# Patient Record
Sex: MALE | Race: ASIAN | Employment: FULL TIME | URBAN - METROPOLITAN AREA
[De-identification: names, ages, dates, MRNs, and addresses within clinical notes are randomized per-mention and may not be internally consistent; named-entity substitution may affect disease eponyms.]

---

## 2021-06-02 ENCOUNTER — HOSPITAL ENCOUNTER (EMERGENCY)
Facility: CLINIC | Age: 21
Discharge: HOME | End: 2021-06-03
Attending: EMERGENCY MEDICINE | Admitting: EMERGENCY MEDICINE
Payer: OTHER MISCELLANEOUS

## 2021-06-02 ENCOUNTER — APPOINTMENT (OUTPATIENT)
Dept: GENERAL RADIOLOGY | Facility: CLINIC | Age: 21
End: 2021-06-02
Attending: INTERNAL MEDICINE
Payer: OTHER MISCELLANEOUS

## 2021-06-02 VITALS
OXYGEN SATURATION: 100 % | DIASTOLIC BLOOD PRESSURE: 78 MMHG | RESPIRATION RATE: 16 BRPM | HEART RATE: 82 BPM | SYSTOLIC BLOOD PRESSURE: 145 MMHG | TEMPERATURE: 98.8 F

## 2021-06-02 DIAGNOSIS — S61.001A AVULSION OF SKIN OF RIGHT THUMB, INITIAL ENCOUNTER: ICD-10-CM

## 2021-06-02 DIAGNOSIS — S60.10XA SUBUNGUAL HEMATOMA OF DIGIT OF HAND, INITIAL ENCOUNTER: ICD-10-CM

## 2021-06-02 PROCEDURE — 99283 EMERGENCY DEPT VISIT LOW MDM: CPT | Mod: 25

## 2021-06-02 PROCEDURE — 73140 X-RAY EXAM OF FINGER(S): CPT | Mod: RT

## 2021-06-02 ASSESSMENT — ENCOUNTER SYMPTOMS: WOUND: 1

## 2021-06-02 NOTE — LETTER
Rand 3, 2021      To Whom It May Concern:      Qasim Perez was seen in our Emergency Department today, 06/03/21.  I expect his condition to improve over the next 7 days.  Weight limit of 10 pounds to right hand. Expect follow up with ortho hand specialist    Sincerely,        Lorena Alicea DO

## 2021-06-03 PROCEDURE — 90714 TD VACC NO PRESV 7 YRS+ IM: CPT | Performed by: EMERGENCY MEDICINE

## 2021-06-03 PROCEDURE — 250N000011 HC RX IP 250 OP 636: Performed by: EMERGENCY MEDICINE

## 2021-06-03 PROCEDURE — 90471 IMMUNIZATION ADMIN: CPT | Performed by: EMERGENCY MEDICINE

## 2021-06-03 RX ORDER — OXYCODONE HYDROCHLORIDE 5 MG/1
5 TABLET ORAL EVERY 6 HOURS PRN
Qty: 5 TABLET | Refills: 0 | Status: SHIPPED | OUTPATIENT
Start: 2021-06-03

## 2021-06-03 RX ORDER — IBUPROFEN 600 MG/1
600 TABLET, FILM COATED ORAL EVERY 6 HOURS PRN
Qty: 20 TABLET | Refills: 0 | Status: SHIPPED | OUTPATIENT
Start: 2021-06-03

## 2021-06-03 RX ADMIN — CLOSTRIDIUM TETANI TOXOID ANTIGEN (FORMALDEHYDE INACTIVATED) AND CORYNEBACTERIUM DIPHTHERIAE TOXOID ANTIGEN (FORMALDEHYDE INACTIVATED) 0.5 ML: 5; 2 INJECTION, SUSPENSION INTRAMUSCULAR at 00:01

## 2021-06-03 ASSESSMENT — ENCOUNTER SYMPTOMS
NUMBNESS: 0
WEAKNESS: 0

## 2021-06-03 NOTE — ED NOTES
Patient's right thumb evaluated for bleeding after digital block and local anesthesia. No visible oozing or blood seen by writer even with pressure applied to wound.  Patient's thumb placed in alumafoam splint after above irrigation and dressing.

## 2021-06-03 NOTE — ED PROVIDER NOTES
History   Chief Complaint:  Hand Injury     HPI   Qasim Perez is a 21 year old male who presents with hand injury. He states he sustained an injury to his right nail while at work today at 1930. He describes smashing his right thumb between a pipe and a tool. He was able to control the bleeding prior to arrival. He denies numbness/weakness to his right thumb. He is right handed. His last tetanus was in 2011 per [facility].     Review of Systems   Skin: Positive for wound.   Neurological: Negative for weakness and numbness.       Allergies:  No known drug allergies    Medications:  The patient is not currently taking any prescribed medications.    Past Medical History:    The patient denies any significant past medical history.    Social History:  Arrives unaccompanied.     Physical Exam     Patient Vitals for the past 24 hrs:   BP Temp Temp src Pulse Resp SpO2   06/02/21 2102 (!) 145/78 98.8  F (37.1  C) Temporal 82 16 100 %       Physical Exam   General: Resting comfortably   Head:  The scalp, face, and head appear normal  Eyes:  The pupils are normal    Conjunctivae and sclera appear normal  ENT:    The nose is normal  Neck:  Normal range of motion  Skin:  No rash or lesions noted.  Neuro:  Speech is normal and fluent  Psych: Awake. Alert.  Normal affect.    MSK:  R. Hand:    R. thumb tinger pad with skin avulsion, TTP, no bony/tendon/arterial injury. Slight subungual hematoma (<1/4 nail) to proximal nail    The finger flexors (FDS/FDP) are intact    The finger extensors are intact    The thumb exam is normal, including:    Adduction, abduction, flexion, extension, opposition    There are no sensory deficits    Median, Ulnar, and Radial nerve function is normal    Radial artery pulsations are normal    Capillary refill is normal    Emergency Department Course     Imaging:  XR Finger Right G/E 2 Views  Normal joint spaces and alignment. No fracture. There is bandaging over the thumb. Irregularity of the skin  surface of the distal end of the suggesting laceration.  Reading per radiology    Procedures    Emergency Department Course:    Reviewed:  I reviewed nursing notes, vitals and past medical history    Assessments:  2346 I obtained history and examined the patient as noted above.   0008 I rechecked the patient.     Interventions:  0001 Tdap 0.5 mg IM    Disposition:  The patient was discharged to home.     Impression & Plan     Medical Decision Making:  Patient is a 21-year-old male presenting with right thumb injury.  He is neurovascularly intact on arrival.  X-ray without focal fracture or dislocation.  There is no evidence to suggest underlying tendon or arterial injury.  No indication for formal suture repair.  Wound was cleaned.  Skin avulsion noted.  He does have slight subungual hematoma though no indication for emergent trephination.  Risk of scarring and infection discussed with patient.  He was placed in a thumb splint and counseled on wound care with bacitracin application.  Planning close outpatient orthopedic hand follow-up.  He is instructed to return to the ED for increasing fever, increasing pain, paresthesias or should symptoms worsen.  Patient provided analgesia on dispo.  All questions addressed.    Diagnosis:    ICD-10-CM    1. Avulsion of skin of right thumb, initial encounter  S61.001A    2. Subungual hematoma of digit of hand, initial encounter  S60.10XA        Discharge Medications:  Discharge Medication List as of 6/3/2021 12:08 AM      START taking these medications    Details   ibuprofen (ADVIL/MOTRIN) 600 MG tablet Take 1 tablet (600 mg) by mouth every 6 hours as needed for moderate pain, Disp-20 tablet, R-0, Local Print      oxyCODONE (ROXICODONE) 5 MG tablet Take 1 tablet (5 mg) by mouth every 6 hours as needed for pain, Disp-5 tablet, R-0, Local Print             Scribe Disclosure:  Praful MEDINA, am serving as a scribe at 11:12 PM on 6/2/2021 to document services personally  performed by Jasmine Alicea DO based on my observations and the provider's statements to me.              Jasmine Alicea DO  06/03/21 0659

## 2021-06-03 NOTE — ED TRIAGE NOTES
Pt aox4, ABCs intact. Pt c/o right thumb pain after smashing it between a pipe and tool at work. Patient states that he smashed his nail. CMS intact. Bleeding controlled at this time.